# Patient Record
Sex: FEMALE | URBAN - METROPOLITAN AREA
[De-identification: names, ages, dates, MRNs, and addresses within clinical notes are randomized per-mention and may not be internally consistent; named-entity substitution may affect disease eponyms.]

---

## 2023-01-03 ENCOUNTER — APPOINTMENT (OUTPATIENT)
Dept: URBAN - METROPOLITAN AREA CLINIC 237 | Age: 23
Setting detail: DERMATOLOGY
End: 2023-01-03

## 2023-01-03 DIAGNOSIS — L20.89 OTHER ATOPIC DERMATITIS: ICD-10-CM

## 2023-01-03 PROBLEM — L20.84 INTRINSIC (ALLERGIC) ECZEMA: Status: ACTIVE | Noted: 2023-01-03

## 2023-01-03 PROCEDURE — OTHER MIPS QUALITY: OTHER

## 2023-01-03 PROCEDURE — OTHER TREATMENT REGIMEN: OTHER

## 2023-01-03 PROCEDURE — OTHER MEDICATION COUNSELING: OTHER

## 2023-01-03 PROCEDURE — OTHER GENTLE SKIN CARE INSTRUCTIONS: OTHER

## 2023-01-03 PROCEDURE — 99203 OFFICE O/P NEW LOW 30 MIN: CPT

## 2023-01-03 PROCEDURE — OTHER PRESCRIPTION: OTHER

## 2023-01-03 PROCEDURE — OTHER COUNSELING: OTHER

## 2023-01-03 RX ORDER — HYDROCORTISONE 25 MG/G
OINTMENT TOPICAL
Qty: 28.35 | Refills: 5 | Status: ERX | COMMUNITY
Start: 2023-01-03

## 2023-01-03 RX ORDER — TRIAMCINOLONE ACETONIDE 1 MG/G
CREAM TOPICAL BID
Qty: 30 | Refills: 2 | Status: ERX | COMMUNITY
Start: 2023-01-03

## 2023-01-03 ASSESSMENT — LOCATION SIMPLE DESCRIPTION DERM
LOCATION SIMPLE: LEFT SUPERIOR EYELID
LOCATION SIMPLE: RIGHT SHOULDER
LOCATION SIMPLE: LEFT HAND
LOCATION SIMPLE: LEFT UPPER BACK
LOCATION SIMPLE: RIGHT SUPERIOR EYELID
LOCATION SIMPLE: RIGHT HAND
LOCATION SIMPLE: LEFT BREAST
LOCATION SIMPLE: RIGHT BREAST

## 2023-01-03 ASSESSMENT — LOCATION DETAILED DESCRIPTION DERM
LOCATION DETAILED: LEFT AREOLA
LOCATION DETAILED: RIGHT LATERAL BREAST 11-12:00 REGION
LOCATION DETAILED: RIGHT RADIAL DORSAL HAND
LOCATION DETAILED: LEFT ULNAR DORSAL HAND
LOCATION DETAILED: RIGHT POSTERIOR SHOULDER
LOCATION DETAILED: LEFT LATERAL SUPERIOR EYELID
LOCATION DETAILED: RIGHT LATERAL SUPERIOR EYELID
LOCATION DETAILED: LEFT SUPERIOR UPPER BACK

## 2023-01-03 ASSESSMENT — LOCATION ZONE DERM
LOCATION ZONE: EYELID
LOCATION ZONE: TRUNK
LOCATION ZONE: ARM
LOCATION ZONE: HAND

## 2023-01-03 ASSESSMENT — SEVERITY ASSESSMENT 2020: SEVERITY 2020: MODERATE

## 2023-01-03 ASSESSMENT — BSA RASH: BSA RASH: 9

## 2023-01-03 NOTE — PROCEDURE: MEDICATION COUNSELING
Defer fluid needs to medical team  Estimated calorie & protein needs based on upper IBW range of 189.2 pounds/85.8 kg  Opzelura Counseling:  I discussed with the patient the risks of Opzelura including but not limited to nasopharngitis, bronchitis, ear infection, eosinophila, hives, diarrhea, folliculitis, tonsillitis, and rhinorrhea.  Taken orally, this medication has been linked to serious infections; higher rate of mortality; malignancy and lymphoproliferative disorders; major adverse cardiovascular events; thrombosis; thrombocytopenia, anemia, and neutropenia; and lipid elevations.

## 2023-01-03 NOTE — PROCEDURE: MEDICATION COUNSELING
no Quinolones Counseling:  I discussed with the patient the risks of fluoroquinolones including but not limited to GI upset, allergic reaction, drug rash, diarrhea, dizziness, photosensitivity, yeast infections, liver function test abnormalities, tendonitis/tendon rupture.

## 2023-01-03 NOTE — HPI: ECZEMA (PATIENT REPORTED)
Where Is Your Eczema Located?: Eyelids, hands, arms, nipples, behind knees and back
List Prescription Topical Steroids You Tried (Separate Each Name With A Comma):: Hydrocortisone, triamcinalone

## 2023-01-03 NOTE — PROCEDURE: TREATMENT REGIMEN
Initiate Treatment: Dupixent 300 mg SC q 2 wks after initial dose of 600 mg SC\\nhydrocortisone 2.5 % topical ointment: Apply sparingly to eczema on face/nipple and neck BID x up to 2 weeks/month prn\\ntriamcinolone acetonide 0.1 % topical cream: Apply small amt to rash on arms and hands bid for up to 2 weeks then take 2 week break, repeat cycle as needed
Detail Level: Zone

## 2023-01-19 ENCOUNTER — RX ONLY (RX ONLY)
Age: 23
End: 2023-01-19

## 2023-01-19 RX ORDER — DUPILUMAB 300 MG/2ML
INJECTION, SOLUTION SUBCUTANEOUS
Qty: 8 | Refills: 3 | Status: ERX | COMMUNITY
Start: 2023-01-19

## 2023-03-09 NOTE — PROCEDURE: MEDICATION COUNSELING
done Xolair Counseling:  Patient informed of potential adverse effects including but not limited to fever, muscle aches, rash and allergic reactions.  The patient verbalized understanding of the proper use and possible adverse effects of Xolair.  All of the patient's questions and concerns were addressed.

## 2024-12-31 NOTE — PROCEDURE: MEDICATION COUNSELING
Patient seen for follow up for anxiety. Chart reviewed and case discussed with treatment team. No events reported overnight. Sleep and appetite is fair. This morning patient was intrusive, interrupting this writer constantly when I was talking to other patients and was upset when I advised her to speak with her nurse. Patient met with this writer and bedside nurse Evelina. Patient was initially very upset as she thought she was been ignored by this writer. I discuss with the patient that see all the patients daily and I will let her know when I have the space available for her. I advised the patient to wait if she sees me attending to a psych emergency or a disruptive patient. Patient verbalized good understanding.   I have reviewed with the patient all of her medications and the recent changes. Patient discuss with me that she is feeling very proud of decreasing her Klonopin and would like to try the Cymbalta. Patient was educated about all of her medications and the treatment plan. Patient's goal is to be able to function again, drive, take care of her self and to be able to travel. She is not interested in doing ECT. She spoke about how she used to be when she was younger and carefree.   I spoke with  and patient together. He was provided with an update of the medications. The plan is to stop the Nortriptyline and Start Cymbalta. We also want for Cammie to take her meds at the same time everyday when she goes home to prevent rebound anxiety.  and patient are in agreement with this.    Detail Level: Simple